# Patient Record
Sex: MALE | Race: WHITE | NOT HISPANIC OR LATINO | Employment: FULL TIME | URBAN - METROPOLITAN AREA
[De-identification: names, ages, dates, MRNs, and addresses within clinical notes are randomized per-mention and may not be internally consistent; named-entity substitution may affect disease eponyms.]

---

## 2023-05-09 ENCOUNTER — HOSPITAL ENCOUNTER (EMERGENCY)
Facility: HOSPITAL | Age: 53
Discharge: HOME | End: 2023-05-09
Attending: EMERGENCY MEDICINE
Payer: MEDICAID

## 2023-05-09 VITALS
WEIGHT: 185 LBS | HEART RATE: 61 BPM | RESPIRATION RATE: 18 BRPM | DIASTOLIC BLOOD PRESSURE: 75 MMHG | BODY MASS INDEX: 25.9 KG/M2 | SYSTOLIC BLOOD PRESSURE: 126 MMHG | HEIGHT: 71 IN | TEMPERATURE: 99 F | OXYGEN SATURATION: 97 %

## 2023-05-09 DIAGNOSIS — M54.31 SCIATICA OF RIGHT SIDE: Primary | ICD-10-CM

## 2023-05-09 LAB
ALBUMIN SERPL BCP-MCNC: 4 G/DL (ref 3.5–5.2)
ALP SERPL-CCNC: 59 U/L (ref 55–135)
ALT SERPL W/O P-5'-P-CCNC: 19 U/L (ref 10–44)
AMPHET+METHAMPHET UR QL: ABNORMAL
ANION GAP SERPL CALC-SCNC: 5 MMOL/L (ref 8–16)
AST SERPL-CCNC: 17 U/L (ref 10–40)
BACTERIA #/AREA URNS HPF: NEGATIVE /HPF
BARBITURATES UR QL SCN>200 NG/ML: NEGATIVE
BASOPHILS # BLD AUTO: 0.05 K/UL (ref 0–0.2)
BASOPHILS NFR BLD: 0.6 % (ref 0–1.9)
BENZODIAZ UR QL SCN>200 NG/ML: NEGATIVE
BILIRUB SERPL-MCNC: 0.6 MG/DL (ref 0.1–1)
BILIRUB UR QL STRIP: NEGATIVE
BUN SERPL-MCNC: 23 MG/DL (ref 6–20)
BZE UR QL SCN: NEGATIVE
CALCIUM SERPL-MCNC: 9.1 MG/DL (ref 8.7–10.5)
CANNABINOIDS UR QL SCN: ABNORMAL
CHLORIDE SERPL-SCNC: 108 MMOL/L (ref 95–110)
CLARITY UR: CLEAR
CO2 SERPL-SCNC: 29 MMOL/L (ref 23–29)
COLOR UR: YELLOW
CREAT SERPL-MCNC: 1 MG/DL (ref 0.5–1.4)
CREAT SERPL-MCNC: 1.2 MG/DL (ref 0.5–1.4)
CREAT UR-MCNC: 227 MG/DL (ref 23–375)
CRP SERPL-MCNC: 0.05 MG/DL
DIFFERENTIAL METHOD: ABNORMAL
EOSINOPHIL # BLD AUTO: 0.1 K/UL (ref 0–0.5)
EOSINOPHIL NFR BLD: 0.6 % (ref 0–8)
ERYTHROCYTE [DISTWIDTH] IN BLOOD BY AUTOMATED COUNT: 13 % (ref 11.5–14.5)
ERYTHROCYTE [SEDIMENTATION RATE] IN BLOOD BY WESTERGREN METHOD: 2 MM/HR (ref 0–10)
EST. GFR  (NO RACE VARIABLE): >60 ML/MIN/1.73 M^2
GLUCOSE SERPL-MCNC: 113 MG/DL (ref 70–110)
GLUCOSE UR QL STRIP: ABNORMAL
HCT VFR BLD AUTO: 46.4 % (ref 40–54)
HGB BLD-MCNC: 15.5 G/DL (ref 14–18)
HGB UR QL STRIP: NEGATIVE
HYALINE CASTS #/AREA URNS LPF: 2 /LPF
IMM GRANULOCYTES # BLD AUTO: 0.02 K/UL (ref 0–0.04)
IMM GRANULOCYTES NFR BLD AUTO: 0.2 % (ref 0–0.5)
KETONES UR QL STRIP: ABNORMAL
LEUKOCYTE ESTERASE UR QL STRIP: NEGATIVE
LYMPHOCYTES # BLD AUTO: 2.7 K/UL (ref 1–4.8)
LYMPHOCYTES NFR BLD: 32.7 % (ref 18–48)
MCH RBC QN AUTO: 30.1 PG (ref 27–31)
MCHC RBC AUTO-ENTMCNC: 33.4 G/DL (ref 32–36)
MCV RBC AUTO: 90 FL (ref 82–98)
MICROSCOPIC COMMENT: ABNORMAL
MONOCYTES # BLD AUTO: 0.6 K/UL (ref 0.3–1)
MONOCYTES NFR BLD: 6.9 % (ref 4–15)
NEUTROPHILS # BLD AUTO: 4.8 K/UL (ref 1.8–7.7)
NEUTROPHILS NFR BLD: 59 % (ref 38–73)
NITRITE UR QL STRIP: NEGATIVE
NRBC BLD-RTO: 0 /100 WBC
OPIATES UR QL SCN: NEGATIVE
PCP UR QL SCN>25 NG/ML: NEGATIVE
PH UR STRIP: 6 [PH] (ref 5–8)
PLATELET # BLD AUTO: 128 K/UL (ref 150–450)
PMV BLD AUTO: 14 FL (ref 9.2–12.9)
POTASSIUM SERPL-SCNC: 3.7 MMOL/L (ref 3.5–5.1)
PROT SERPL-MCNC: 7.1 G/DL (ref 6–8.4)
PROT UR QL STRIP: ABNORMAL
RBC # BLD AUTO: 5.15 M/UL (ref 4.6–6.2)
RBC #/AREA URNS HPF: 2 /HPF (ref 0–4)
SAMPLE: NORMAL
SODIUM SERPL-SCNC: 142 MMOL/L (ref 136–145)
SP GR UR STRIP: >1.03 (ref 1–1.03)
SQUAMOUS #/AREA URNS HPF: 1 /HPF
TOXICOLOGY INFORMATION: ABNORMAL
URN SPEC COLLECT METH UR: ABNORMAL
UROBILINOGEN UR STRIP-ACNC: NEGATIVE EU/DL
WBC # BLD AUTO: 8.17 K/UL (ref 3.9–12.7)
WBC #/AREA URNS HPF: 1 /HPF (ref 0–5)
YEAST URNS QL MICRO: ABNORMAL

## 2023-05-09 PROCEDURE — 85651 RBC SED RATE NONAUTOMATED: CPT | Performed by: EMERGENCY MEDICINE

## 2023-05-09 PROCEDURE — 85025 COMPLETE CBC W/AUTO DIFF WBC: CPT | Performed by: EMERGENCY MEDICINE

## 2023-05-09 PROCEDURE — 80053 COMPREHEN METABOLIC PANEL: CPT | Performed by: EMERGENCY MEDICINE

## 2023-05-09 PROCEDURE — 63600175 PHARM REV CODE 636 W HCPCS: Performed by: EMERGENCY MEDICINE

## 2023-05-09 PROCEDURE — A9585 GADOBUTROL INJECTION: HCPCS | Performed by: EMERGENCY MEDICINE

## 2023-05-09 PROCEDURE — 96374 THER/PROPH/DIAG INJ IV PUSH: CPT | Mod: 59

## 2023-05-09 PROCEDURE — 99285 EMERGENCY DEPT VISIT HI MDM: CPT | Mod: 25

## 2023-05-09 PROCEDURE — 80307 DRUG TEST PRSMV CHEM ANLYZR: CPT | Performed by: EMERGENCY MEDICINE

## 2023-05-09 PROCEDURE — 81001 URINALYSIS AUTO W/SCOPE: CPT | Mod: 59 | Performed by: EMERGENCY MEDICINE

## 2023-05-09 PROCEDURE — 86140 C-REACTIVE PROTEIN: CPT | Performed by: EMERGENCY MEDICINE

## 2023-05-09 PROCEDURE — 25500020 PHARM REV CODE 255: Performed by: EMERGENCY MEDICINE

## 2023-05-09 RX ORDER — MELOXICAM 7.5 MG/1
7.5 TABLET ORAL DAILY
Qty: 10 TABLET | Refills: 0 | Status: SHIPPED | OUTPATIENT
Start: 2023-05-09 | End: 2023-05-19

## 2023-05-09 RX ORDER — GADOBUTROL 604.72 MG/ML
7.5 INJECTION INTRAVENOUS
Status: COMPLETED | OUTPATIENT
Start: 2023-05-09 | End: 2023-05-09

## 2023-05-09 RX ORDER — LORAZEPAM 2 MG/ML
1 INJECTION INTRAMUSCULAR
Status: COMPLETED | OUTPATIENT
Start: 2023-05-09 | End: 2023-05-09

## 2023-05-09 RX ADMIN — GADOBUTROL 7.5 ML: 604.72 INJECTION INTRAVENOUS at 03:05

## 2023-05-09 RX ADMIN — LORAZEPAM 1 MG: 2 INJECTION INTRAMUSCULAR; INTRAVENOUS at 02:05

## 2023-05-09 NOTE — ED NOTES
Lower right sided back pain after working on his truck taking out the turbo. States the pain shoots down his right hip and leg and into his foot. Denies any chest pain or sob. Denies any abd pain

## 2023-05-09 NOTE — ED PROVIDER NOTES
Encounter Date: 5/9/2023       History     Chief Complaint   Patient presents with    Back Pain     53-year-old male presents emergency department for emergent evaluation of back pain.  Patient reports that he injured his back 3 weeks ago tugging on a motor in his car he states that he was limping however he was still able to go to work he reports that his pain has gotten progressively worse he reports everything in anything makes the pain worse remaining still and flat makes the pain better he was seen at [facility] yesterday and given Toradol and muscle relaxers he reports his pain is not improved patient is endorsing some intermittent numbness to his groin he states the pain also is across his low back and radiates down his right lower extremity he feels as if he has some weakness when he walks.  The patient does take methadone daily    Review of patient's allergies indicates:  No Known Allergies  Past Medical History:   Diagnosis Date    Kidney stone     Sciatica      Past Surgical History:   Procedure Laterality Date    APPENDECTOMY      TONSILLECTOMY       No family history on file.  Social History     Tobacco Use    Smoking status: Every Day     Packs/day: 1.00     Years: 35.00     Pack years: 35.00     Types: Cigarettes    Smokeless tobacco: Never   Substance Use Topics    Alcohol use: No    Drug use: Not Currently     Types: IV     Review of Systems   Constitutional: Negative.    HENT: Negative.     Respiratory: Negative.     Cardiovascular: Negative.    Gastrointestinal: Negative.    Genitourinary: Negative.    Musculoskeletal:  Positive for back pain.   Skin: Negative.    Neurological: Negative.    Hematological: Negative.    Psychiatric/Behavioral: Negative.     All other systems reviewed and are negative.    Physical Exam     Initial Vitals [05/09/23 1201]   BP Pulse Resp Temp SpO2   (!) 170/92 69 18 98.8 °F (37.1 °C) 97 %      MAP       --         Physical Exam    Nursing note and vitals  reviewed.  Constitutional: He appears well-developed and well-nourished.   HENT:   Head: Normocephalic and atraumatic.   Eyes: EOM are normal. Pupils are equal, round, and reactive to light.   Neck: Neck supple.   Normal range of motion.  Cardiovascular:  Normal rate, regular rhythm, normal heart sounds and intact distal pulses.           Pulmonary/Chest: Breath sounds normal.   Abdominal: Abdomen is soft. Bowel sounds are normal.   Musculoskeletal:      Cervical back: Normal range of motion and neck supple.      Lumbar back: Spasms and tenderness present. Decreased range of motion. Positive right straight leg raise test.     Neurological: He is alert and oriented to person, place, and time. He has normal strength. GCS score is 15. GCS eye subscore is 4. GCS verbal subscore is 5. GCS motor subscore is 6.   I personally witnessed the patient get off of 1 stretcher and ambulate to the neck stretcher he was able to heel walk, and toe walk.   Skin: Skin is warm.   Psychiatric: He has a normal mood and affect.       ED Course   Procedures  Labs Reviewed   URINALYSIS, REFLEX TO URINE CULTURE - Abnormal; Notable for the following components:       Result Value    Specific Gravity, UA >1.030 (*)     Protein, UA Trace (*)     Glucose, UA 4+ (*)     Ketones, UA Trace (*)     All other components within normal limits    Narrative:     Specimen Source->Urine   CBC W/ AUTO DIFFERENTIAL - Abnormal; Notable for the following components:    Platelets 128 (*)     MPV 14.0 (*)     All other components within normal limits   COMPREHENSIVE METABOLIC PANEL - Abnormal; Notable for the following components:    Glucose 113 (*)     BUN 23 (*)     Anion Gap 5 (*)     All other components within normal limits   URINALYSIS MICROSCOPIC - Abnormal; Notable for the following components:    Hyaline Casts, UA 2 (*)     All other components within normal limits    Narrative:     Specimen Source->Urine   DRUG SCREEN PANEL, URINE EMERGENCY - Abnormal;  Notable for the following components:    Amphetamine Screen, Ur Presumptive Positive (*)     THC Presumptive Positive (*)     All other components within normal limits   SEDIMENTATION RATE   C-REACTIVE PROTEIN   DRUG SCREEN PANEL, URINE EMERGENCY   ISTAT CREATININE   POCT CREATININE          Imaging Results              MRI Lumbar Spine W WO Cont (Final result)  Result time 05/09/23 16:00:49      Final result by Wesly Valera MD (05/09/23 16:00:49)                   Narrative:    MR LUMBAR SPINE WITHOUT THEN WITH IV CONTRAST    CLINICAL HISTORY:  53 years Male Low back pain, cauda equina syndrome suspected    COMPARISON: Lumbar spine radiography May 7, 2023    FINDINGS: Levocurvature of the upper lumbar spine centered at L1 -2 measuring 18 degrees. Dextrocurvature of the lower lumbar spine centered at L4 measuring 17 degrees. Grade 1 retrolisthesis L1 on L2, L2 on L3, and L3 on L4 measuring 2 to 3 mm.    Lumbar vertebral body heights are maintained. Reactive marrow edema of the opposing L1-2 and L4-5 vertebral bodies. No aggressive T1 marrow replacing lesion.    The conus medullaris appears normal and terminates at the L1-2 intervertebral disc space level. Cauda equina is unremarkable. Paraspinal muscles show mild edema and right lumbosacral junction suggesting strain. Limited images through the retroperitoneum show a small left renal cyst. No acute pathology. Bilateral SI joint degenerative changes.    T12-L1: Moderate broad-based disc bulge which mildly narrows the spinal canal. Mild facet arthropathy. Mild foraminal narrowing on the right.    L1-2: Near complete degenerative disc space loss. Bilateral facet arthropathy, greater on the right. Broad-based disc osteophyte complex (with asymmetric right lateral disc osteophyte complex) causing mild narrowing of the spinal canal. Mild right-sided foraminal narrowing.    L2-3: Moderate bilateral facet arthropathy with ligamentum flavum hypertrophy. Moderate  broad-based disc bulge. Extruded disc tissue extending inferiorly from the right paracentral L2-3 disc along the rightward posterior aspect of the L3 vertebral body, severely narrowing the right lateral recess and contacting the transiting right L3 nerve root. Extruded disc fragment measures 12 x 7 mm on sagittal T1 image 11 series 11. Moderate bilateral neural foraminal narrowing.    L3-4: Moderate bilateral facet arthropathy, greater on the left. Moderate broad-based disc bulge. Narrowing of the lateral recesses bilaterally, greater on the left. Severe narrowing left neural foramen. Moderate narrowing of right neural foramen.    L4-5: Severe left and moderate right facet arthropathy. Degenerative disc space loss. Broad-based disc bulge. No spinal canal narrowing. Severe left neural foraminal narrowing. Moderate right neural foramen. L5-S1: Moderate bilateral facet arthropathy. Broad-based disc bulge. No spinal canal narrowing. Moderate/severe bilateral neural foraminal narrowing.    Postcontrast imaging shows no pathologic intramedullary or intrathecal contrast enhancement. Normal enhancement of the bones and soft tissues of the lumbar spine.    IMPRESSION:    Advanced multilevel lumbar spondylosis as described above, exacerbated by scoliotic curvature.    Extruded disc at L2-3 extending inferiorly, and causing severe narrowing of right lateral recess, contacting the transiting right L3 nerve root.    No pathologic postcontrast enhancement.    Electronically signed by:  Wesly Valear MD  5/9/2023 4:00 PM CDT Workstation: [id number]                                     Medications   LORazepam injection 1 mg (1 mg Intravenous Given 5/9/23 1423)   gadobutroL (GADAVIST) injection 7.5 mL (7.5 mLs Intravenous Given 5/9/23 0968)     Medical Decision Making:   History:   Old Records Summarized: records from previous admission(s).  Initial Assessment:   53-year-old male presents emergency department for emergent evaluation  of back pain.  Patient reports that he injured his back 3 weeks ago tugging on a motor in his car he states that he was limping however he was still able to go to work he reports that his pain has gotten progressively worse he reports everything in anything makes the pain worse remaining still and flat makes the pain better he was seen at [facility] yesterday and given Toradol and muscle relaxers he reports his pain is not improved patient is endorsing some intermittent numbness to his groin he states the pain also is across his low back and radiates down his right lower extremity he feels as if he has some weakness when he walks.  The patient does take methadone daily    Differential Diagnosis:   Considerations include cauda equina, herniated disc, lumbar radiculopathy, epidural abscess  Clinical Tests:   Lab Tests: Ordered and Reviewed  Radiological Study: Ordered and Reviewed  ED Management:  53-year-old male presents to the emergency department for emergent evaluation of low back pain that radiates down the right leg.  Patient reports that he hurt himself 3 weeks ago by pulling on a motor in his car.  He was seen at [facility] yesterday he had negative plain films of his back, he was discharged home after receiving Toradol and IM steroids with lidocaine patches and Norflex.  The patient reports that his symptoms are not better however he does not endorse that they are worse.  Patient reports that he has some intermittent numbness to his groin therefore further evaluation was performed with labs, and MRI.  Patient has advanced multi level lumbar spondylosis, and extruded disc at L2, L3 extending inferiorly that is causing narrowing of the right lateral recess contacting on the right L3 nerve root consistent with patient's physical exam.  IV contrast reveals no enhancement.  I discussed findings with my attending physician Dr. Castellanos  patient has no evidence of cauda equina, or epidural  abscess and will be discharged home.  Patient does take methadone he will be discharged home with further Mobic and instructed follow up with Neurosurgery who was given return precautions                        Clinical Impression:   Final diagnoses:  [M54.31] Sciatica of right side (Primary)        ED Disposition Condition    Discharge Stable          ED Prescriptions       Medication Sig Dispense Start Date End Date Auth. Provider    meloxicam (MOBIC) 7.5 MG tablet Take 1 tablet (7.5 mg total) by mouth once daily. for 10 days 10 tablet 5/9/2023 5/19/2023 CORRINE Darden          Follow-up Information       Follow up With Specialties Details Why Contact Info    Curry Walls MD Neurosurgery, Spine Surgery Schedule an appointment as soon as possible for a visit in 2 days  [address]  [place] [address]  [phone]               CORRINE Darden  05/09/23 6249

## 2023-05-09 NOTE — DISCHARGE INSTRUCTIONS
Norflex as directed for muscle spasms  Mobic as directed for anti inflammatory medication  Alternate moist heat and ice  Follow-up with neurosurgery as directed for definitive care  Return for any concerns

## 2023-05-11 ENCOUNTER — TELEPHONE (OUTPATIENT)
Dept: NEUROSURGERY | Facility: CLINIC | Age: 53
End: 2023-05-11
Payer: MEDICAID

## 2023-05-11 NOTE — TELEPHONE ENCOUNTER
Patient's wife has been called informed her Dr. Walls's nurse was out and would call her if Dr. Walls would see patient.

## 2023-05-11 NOTE — TELEPHONE ENCOUNTER
----- Message from Selena Gaming MA sent at 5/10/2023  5:48 PM CDT -----  Regarding: FW: Appt Access  Contact: Kandace [phone]    ----- Message -----  From: Mary Jane Stewart  Sent: 5/10/2023   8:56 AM CDT  To: , #  Subject: Appt Access                                      Kandace/spouse calling to schedule a New patient calling to schedule f/u hospital visit for , ED entered referral in Epic. Pls call

## 2023-05-11 NOTE — TELEPHONE ENCOUNTER
----- Message from Helga Hernandez sent at 5/11/2023 12:00 PM CDT -----  Regarding: Appt  Contact: Kandace [phone]  Kandace/ wife is calling stating she is trying to schedule a appt for pt states Dr. Castellanos at [facility] Recommended this provider pt will be a new pt please call

## 2023-09-18 ENCOUNTER — PATIENT MESSAGE (OUTPATIENT)
Dept: PRIMARY CARE CLINIC | Facility: CLINIC | Age: 53
End: 2023-09-18
Payer: MEDICAID

## 2023-10-18 ENCOUNTER — PATIENT MESSAGE (OUTPATIENT)
Dept: CARDIOLOGY | Facility: CLINIC | Age: 53
End: 2023-10-18
Payer: MEDICAID

## 2024-09-19 ENCOUNTER — PATIENT MESSAGE (OUTPATIENT)
Dept: PRIMARY CARE CLINIC | Facility: CLINIC | Age: 54
End: 2024-09-19
Payer: MEDICAID